# Patient Record
Sex: FEMALE | Race: WHITE | NOT HISPANIC OR LATINO | Employment: FULL TIME | URBAN - METROPOLITAN AREA
[De-identification: names, ages, dates, MRNs, and addresses within clinical notes are randomized per-mention and may not be internally consistent; named-entity substitution may affect disease eponyms.]

---

## 2024-01-11 ENCOUNTER — APPOINTMENT (OUTPATIENT)
Dept: RADIOLOGY | Facility: CLINIC | Age: 79
End: 2024-01-11
Payer: MEDICARE

## 2024-01-25 ENCOUNTER — HOSPITAL ENCOUNTER (OUTPATIENT)
Dept: RADIOLOGY | Facility: CLINIC | Age: 79
Discharge: HOME | End: 2024-01-25
Payer: MEDICARE

## 2024-01-25 VITALS — BODY MASS INDEX: 27.81 KG/M2 | HEIGHT: 63 IN | WEIGHT: 156.97 LBS

## 2024-01-25 DIAGNOSIS — Z12.31 ENCOUNTER FOR SCREENING MAMMOGRAM FOR MALIGNANT NEOPLASM OF BREAST: ICD-10-CM

## 2024-01-25 PROCEDURE — 77067 SCR MAMMO BI INCL CAD: CPT | Performed by: RADIOLOGY

## 2024-01-25 PROCEDURE — 77067 SCR MAMMO BI INCL CAD: CPT

## 2024-01-25 PROCEDURE — 77063 BREAST TOMOSYNTHESIS BI: CPT | Performed by: RADIOLOGY

## 2024-09-11 ENCOUNTER — TELEPHONE (OUTPATIENT)
Dept: UROLOGY | Facility: CLINIC | Age: 79
End: 2024-09-11
Payer: MEDICARE

## 2024-09-11 ENCOUNTER — LAB (OUTPATIENT)
Dept: LAB | Facility: LAB | Age: 79
End: 2024-09-11
Payer: MEDICARE

## 2024-09-11 DIAGNOSIS — R39.9 UTI SYMPTOMS: Primary | ICD-10-CM

## 2024-09-11 DIAGNOSIS — R39.9 UTI SYMPTOMS: ICD-10-CM

## 2024-09-11 LAB
APPEARANCE UR: CLEAR
BILIRUB UR STRIP.AUTO-MCNC: NEGATIVE MG/DL
COLOR UR: ABNORMAL
GLUCOSE UR STRIP.AUTO-MCNC: NORMAL MG/DL
KETONES UR STRIP.AUTO-MCNC: NEGATIVE MG/DL
LEUKOCYTE ESTERASE UR QL STRIP.AUTO: NEGATIVE
MUCOUS THREADS #/AREA URNS AUTO: ABNORMAL /LPF
NITRITE UR QL STRIP.AUTO: NEGATIVE
PH UR STRIP.AUTO: 6 [PH]
PROT UR STRIP.AUTO-MCNC: NEGATIVE MG/DL
RBC # UR STRIP.AUTO: ABNORMAL /UL
RBC #/AREA URNS AUTO: >20 /HPF
SP GR UR STRIP.AUTO: 1.02
SQUAMOUS #/AREA URNS AUTO: ABNORMAL /HPF
UROBILINOGEN UR STRIP.AUTO-MCNC: NORMAL MG/DL
WBC #/AREA URNS AUTO: ABNORMAL /HPF

## 2024-09-11 PROCEDURE — 81001 URINALYSIS AUTO W/SCOPE: CPT

## 2024-09-11 NOTE — TELEPHONE ENCOUNTER
Spoke with pt about symptoms. Pt states she is having urgency and pain. Urinalysis with culture ordered.

## 2024-09-12 ENCOUNTER — OFFICE VISIT (OUTPATIENT)
Dept: UROLOGY | Facility: CLINIC | Age: 79
End: 2024-09-12
Payer: MEDICARE

## 2024-09-12 VITALS
BODY MASS INDEX: 27.64 KG/M2 | WEIGHT: 156 LBS | HEART RATE: 87 BPM | SYSTOLIC BLOOD PRESSURE: 166 MMHG | HEIGHT: 63 IN | TEMPERATURE: 97.3 F | DIASTOLIC BLOOD PRESSURE: 95 MMHG

## 2024-09-12 DIAGNOSIS — R35.0 URINARY FREQUENCY: ICD-10-CM

## 2024-09-12 DIAGNOSIS — R31.29 MICROHEMATURIA: Primary | ICD-10-CM

## 2024-09-12 LAB — HOLD SPECIMEN: NORMAL

## 2024-09-12 PROCEDURE — 1159F MED LIST DOCD IN RCRD: CPT | Performed by: NURSE PRACTITIONER

## 2024-09-12 PROCEDURE — 99203 OFFICE O/P NEW LOW 30 MIN: CPT | Performed by: NURSE PRACTITIONER

## 2024-09-12 PROCEDURE — G2211 COMPLEX E/M VISIT ADD ON: HCPCS | Performed by: NURSE PRACTITIONER

## 2024-09-12 RX ORDER — ATORVASTATIN CALCIUM 10 MG/1
1 TABLET, FILM COATED ORAL DAILY
COMMUNITY
Start: 2014-10-07

## 2024-09-12 RX ORDER — LISINOPRIL 20 MG/1
1 TABLET ORAL DAILY
COMMUNITY
Start: 2014-10-07

## 2024-09-12 NOTE — PROGRESS NOTES
Subjective   Patient ID: Jeane Mariee is a 79 y.o. female who presents for No chief complaint on file..  Presents for eval of new onset urinary frequency over the last year. History of nephrolithiasis twice, most recently in 2020, both passed spontaneously. Denies history of gross hematuria and dysuria.     Urinating up to 12 times per day and up to 3 times per night. Wearing one pad/day. Moderate amount of urgency. Two isolated episodes of nocturnal enuresis over the last year. Very minimal JOSE A.     Drinks minimal amount of fluids per her perception, combo of milk, water, pop, and juice.     Never smoked. Denies environmental toxin exposure.         Review of Systems   All other systems reviewed and are negative.      Objective   Physical Exam  Vitals reviewed.     Constitutional: Patient generally appears stated age. Good nutrition. No deformities. Good attention to grooming.  Neck: No neck masses. Good symmetry. No crepitus. Normal thyroid size and consistency with no masses.  Respiratory: Normal respiratory effort. No intercostal retractions. No use of accessory muscles.  Cardiovascular: Examination of the peripheral vascular system reveals no swelling or varicosities with good pulses. Normal extremity temperature, no edema or tenderness.  Abdomen: Examination of the abdomen reveals no masses or tenderness. No hernias detected. Normal liver and spleen size.   Lymphatic: No palpable lymph nodes in the neck, axilla, groin or other locations  Skin: Inspection of the skin reveals no rashes, lesions, ulcers.  Neurologic/Psychiatric: Oriented to time, place and person. Normal mood and affect with no depression, anxiety, or agitation.    No visits with results within 1 Day(s) from this visit.   Latest known visit with results is:   Lab on 09/11/2024   Component Date Value Ref Range Status    Color, Urine 09/11/2024 Light-Yellow  Light-Yellow, Yellow, Dark-Yellow Final    Appearance, Urine 09/11/2024 Clear  Clear  Final    Specific Gravity, Urine 09/11/2024 1.019  1.005 - 1.035 Final    pH, Urine 09/11/2024 6.0  5.0, 5.5, 6.0, 6.5, 7.0, 7.5, 8.0 Final    Protein, Urine 09/11/2024 NEGATIVE  NEGATIVE, 10 (TRACE), 20 (TRACE) mg/dL Final    Glucose, Urine 09/11/2024 Normal  Normal mg/dL Final    Blood, Urine 09/11/2024 0.1 (1+) (A)  NEGATIVE Final    Ketones, Urine 09/11/2024 NEGATIVE  NEGATIVE mg/dL Final    Bilirubin, Urine 09/11/2024 NEGATIVE  NEGATIVE Final    Urobilinogen, Urine 09/11/2024 Normal  Normal mg/dL Final    Nitrite, Urine 09/11/2024 NEGATIVE  NEGATIVE Final    Leukocyte Esterase, Urine 09/11/2024 NEGATIVE  NEGATIVE Final    Extra Tube 09/11/2024 Hold for add-ons.   Final    Auto resulted.    WBC, Urine 09/11/2024 1-5  1-5, NONE /HPF Final    RBC, Urine 09/11/2024 >20 (A)  NONE, 1-2, 3-5 /HPF Final    Squamous Epithelial Cells, Urine 09/11/2024 1-9 (SPARSE)  Reference range not established. /HPF Final    Mucus, Urine 09/11/2024 FEW  Reference range not established. /LPF Final         Assessment/Plan   Diagnoses and all orders for this visit:  Microhematuria  -     CT urography w 3D volume rendered imaging; Future  -     Creatinine, Serum; Future  -     Cystourethroscopy; Future  Urinary frequency  -     Post-Void Residual  -     CT urography w 3D volume rendered imaging; Future  -     Creatinine, Serum; Future  -     Cystourethroscopy; Future    Reviewed AUA guidelines for further eval of microhematuria. Will proceed with CTU for further eval and follow up cystoscopy. Strongly encouraged increase in fluid intake. Patient is in agreement with plan. She will be scheduled accordingly.        SONU Gipson-CNP 09/12/24 1:49 PM

## 2024-09-18 ENCOUNTER — LAB (OUTPATIENT)
Dept: LAB | Facility: LAB | Age: 79
End: 2024-09-18
Payer: MEDICARE

## 2024-09-18 DIAGNOSIS — R35.0 URINARY FREQUENCY: ICD-10-CM

## 2024-09-18 DIAGNOSIS — R31.29 MICROHEMATURIA: ICD-10-CM

## 2024-09-18 LAB
CREAT SERPL-MCNC: 0.95 MG/DL (ref 0.5–1.05)
EGFRCR SERPLBLD CKD-EPI 2021: 60 ML/MIN/1.73M*2

## 2024-09-18 PROCEDURE — 82565 ASSAY OF CREATININE: CPT

## 2024-09-18 PROCEDURE — 36415 COLL VENOUS BLD VENIPUNCTURE: CPT

## 2024-09-24 ENCOUNTER — HOSPITAL ENCOUNTER (OUTPATIENT)
Dept: RADIOLOGY | Facility: HOSPITAL | Age: 79
Discharge: HOME | End: 2024-09-24
Payer: MEDICARE

## 2024-09-24 DIAGNOSIS — R31.29 MICROHEMATURIA: ICD-10-CM

## 2024-09-24 DIAGNOSIS — R35.0 URINARY FREQUENCY: ICD-10-CM

## 2024-09-24 PROCEDURE — 74177 CT ABD & PELVIS W/CONTRAST: CPT | Performed by: RADIOLOGY

## 2024-09-24 PROCEDURE — 76377 3D RENDER W/INTRP POSTPROCES: CPT

## 2024-09-24 PROCEDURE — 76377 3D RENDER W/INTRP POSTPROCES: CPT | Performed by: RADIOLOGY

## 2024-09-24 PROCEDURE — 2550000001 HC RX 255 CONTRASTS: Performed by: NURSE PRACTITIONER

## 2024-09-26 ENCOUNTER — APPOINTMENT (OUTPATIENT)
Dept: UROLOGY | Facility: CLINIC | Age: 79
End: 2024-09-26
Payer: MEDICARE

## 2024-09-26 ENCOUNTER — TELEPHONE (OUTPATIENT)
Dept: UROLOGY | Facility: CLINIC | Age: 79
End: 2024-09-26

## 2024-09-27 ENCOUNTER — TELEPHONE (OUTPATIENT)
Dept: UROLOGY | Facility: CLINIC | Age: 79
End: 2024-09-27

## 2024-10-04 ENCOUNTER — HOSPITAL ENCOUNTER (EMERGENCY)
Facility: HOSPITAL | Age: 79
Discharge: HOME | End: 2024-10-04
Attending: EMERGENCY MEDICINE
Payer: MEDICARE

## 2024-10-04 ENCOUNTER — APPOINTMENT (OUTPATIENT)
Dept: RADIOLOGY | Facility: HOSPITAL | Age: 79
End: 2024-10-04
Payer: MEDICARE

## 2024-10-04 ENCOUNTER — APPOINTMENT (OUTPATIENT)
Dept: CARDIOLOGY | Facility: HOSPITAL | Age: 79
End: 2024-10-04
Payer: MEDICARE

## 2024-10-04 VITALS
BODY MASS INDEX: 26.93 KG/M2 | SYSTOLIC BLOOD PRESSURE: 160 MMHG | RESPIRATION RATE: 16 BRPM | OXYGEN SATURATION: 98 % | TEMPERATURE: 98.7 F | HEIGHT: 63 IN | HEART RATE: 94 BPM | WEIGHT: 152 LBS | DIASTOLIC BLOOD PRESSURE: 93 MMHG

## 2024-10-04 DIAGNOSIS — N20.0 KIDNEY STONE: Primary | ICD-10-CM

## 2024-10-04 LAB
ALBUMIN SERPL BCP-MCNC: 3.9 G/DL (ref 3.4–5)
ALP SERPL-CCNC: 76 U/L (ref 33–136)
ALT SERPL W P-5'-P-CCNC: 12 U/L (ref 7–45)
ANION GAP SERPL CALC-SCNC: 14 MMOL/L (ref 10–20)
APPEARANCE UR: CLEAR
AST SERPL W P-5'-P-CCNC: 16 U/L (ref 9–39)
BACTERIA #/AREA URNS AUTO: ABNORMAL /HPF
BASOPHILS # BLD AUTO: 0.06 X10*3/UL (ref 0–0.1)
BASOPHILS NFR BLD AUTO: 0.7 %
BILIRUB SERPL-MCNC: 0.8 MG/DL (ref 0–1.2)
BILIRUB UR STRIP.AUTO-MCNC: NEGATIVE MG/DL
BUN SERPL-MCNC: 25 MG/DL (ref 6–23)
CALCIUM SERPL-MCNC: 9.8 MG/DL (ref 8.6–10.3)
CARDIAC TROPONIN I PNL SERPL HS: 4 NG/L (ref 0–13)
CHLORIDE SERPL-SCNC: 104 MMOL/L (ref 98–107)
CO2 SERPL-SCNC: 23 MMOL/L (ref 21–32)
COLOR UR: ABNORMAL
CREAT SERPL-MCNC: 1.24 MG/DL (ref 0.5–1.05)
EGFRCR SERPLBLD CKD-EPI 2021: 44 ML/MIN/1.73M*2
EOSINOPHIL # BLD AUTO: 0.07 X10*3/UL (ref 0–0.4)
EOSINOPHIL NFR BLD AUTO: 0.9 %
ERYTHROCYTE [DISTWIDTH] IN BLOOD BY AUTOMATED COUNT: 12.8 % (ref 11.5–14.5)
GLUCOSE SERPL-MCNC: 102 MG/DL (ref 74–99)
GLUCOSE UR STRIP.AUTO-MCNC: NORMAL MG/DL
HCT VFR BLD AUTO: 40.9 % (ref 36–46)
HGB BLD-MCNC: 13.3 G/DL (ref 12–16)
HOLD SPECIMEN: NORMAL
HOLD SPECIMEN: NORMAL
IMM GRANULOCYTES # BLD AUTO: 0.03 X10*3/UL (ref 0–0.5)
IMM GRANULOCYTES NFR BLD AUTO: 0.4 % (ref 0–0.9)
KETONES UR STRIP.AUTO-MCNC: NEGATIVE MG/DL
LEUKOCYTE ESTERASE UR QL STRIP.AUTO: NEGATIVE
LIPASE SERPL-CCNC: 45 U/L (ref 9–82)
LYMPHOCYTES # BLD AUTO: 0.71 X10*3/UL (ref 0.8–3)
LYMPHOCYTES NFR BLD AUTO: 8.6 %
MCH RBC QN AUTO: 30.3 PG (ref 26–34)
MCHC RBC AUTO-ENTMCNC: 32.5 G/DL (ref 32–36)
MCV RBC AUTO: 93 FL (ref 80–100)
MONOCYTES # BLD AUTO: 0.69 X10*3/UL (ref 0.05–0.8)
MONOCYTES NFR BLD AUTO: 8.4 %
MUCOUS THREADS #/AREA URNS AUTO: ABNORMAL /LPF
NEUTROPHILS # BLD AUTO: 6.66 X10*3/UL (ref 1.6–5.5)
NEUTROPHILS NFR BLD AUTO: 81 %
NITRITE UR QL STRIP.AUTO: NEGATIVE
NRBC BLD-RTO: 0 /100 WBCS (ref 0–0)
PH UR STRIP.AUTO: 5 [PH]
PLATELET # BLD AUTO: 224 X10*3/UL (ref 150–450)
POTASSIUM SERPL-SCNC: 4 MMOL/L (ref 3.5–5.3)
PROT SERPL-MCNC: 7.2 G/DL (ref 6.4–8.2)
PROT UR STRIP.AUTO-MCNC: NEGATIVE MG/DL
RBC # BLD AUTO: 4.39 X10*6/UL (ref 4–5.2)
RBC # UR STRIP.AUTO: ABNORMAL /UL
RBC #/AREA URNS AUTO: ABNORMAL /HPF
SODIUM SERPL-SCNC: 137 MMOL/L (ref 136–145)
SP GR UR STRIP.AUTO: 1.01
SQUAMOUS #/AREA URNS AUTO: ABNORMAL /HPF
UROBILINOGEN UR STRIP.AUTO-MCNC: NORMAL MG/DL
WBC # BLD AUTO: 8.2 X10*3/UL (ref 4.4–11.3)
WBC #/AREA URNS AUTO: ABNORMAL /HPF

## 2024-10-04 PROCEDURE — 80053 COMPREHEN METABOLIC PANEL: CPT | Performed by: STUDENT IN AN ORGANIZED HEALTH CARE EDUCATION/TRAINING PROGRAM

## 2024-10-04 PROCEDURE — 96365 THER/PROPH/DIAG IV INF INIT: CPT | Mod: 59

## 2024-10-04 PROCEDURE — 81001 URINALYSIS AUTO W/SCOPE: CPT | Performed by: EMERGENCY MEDICINE

## 2024-10-04 PROCEDURE — 74177 CT ABD & PELVIS W/CONTRAST: CPT | Performed by: STUDENT IN AN ORGANIZED HEALTH CARE EDUCATION/TRAINING PROGRAM

## 2024-10-04 PROCEDURE — 93005 ELECTROCARDIOGRAM TRACING: CPT

## 2024-10-04 PROCEDURE — 2550000001 HC RX 255 CONTRASTS: Performed by: EMERGENCY MEDICINE

## 2024-10-04 PROCEDURE — 99285 EMERGENCY DEPT VISIT HI MDM: CPT | Mod: 25

## 2024-10-04 PROCEDURE — 87086 URINE CULTURE/COLONY COUNT: CPT | Mod: STJLAB | Performed by: EMERGENCY MEDICINE

## 2024-10-04 PROCEDURE — 80053 COMPREHEN METABOLIC PANEL: CPT | Performed by: EMERGENCY MEDICINE

## 2024-10-04 PROCEDURE — 83690 ASSAY OF LIPASE: CPT | Performed by: EMERGENCY MEDICINE

## 2024-10-04 PROCEDURE — 36415 COLL VENOUS BLD VENIPUNCTURE: CPT | Performed by: STUDENT IN AN ORGANIZED HEALTH CARE EDUCATION/TRAINING PROGRAM

## 2024-10-04 PROCEDURE — 83690 ASSAY OF LIPASE: CPT | Performed by: STUDENT IN AN ORGANIZED HEALTH CARE EDUCATION/TRAINING PROGRAM

## 2024-10-04 PROCEDURE — 99285 EMERGENCY DEPT VISIT HI MDM: CPT | Performed by: EMERGENCY MEDICINE

## 2024-10-04 PROCEDURE — 74177 CT ABD & PELVIS W/CONTRAST: CPT

## 2024-10-04 PROCEDURE — 2500000004 HC RX 250 GENERAL PHARMACY W/ HCPCS (ALT 636 FOR OP/ED): Performed by: EMERGENCY MEDICINE

## 2024-10-04 PROCEDURE — 85025 COMPLETE CBC W/AUTO DIFF WBC: CPT | Performed by: STUDENT IN AN ORGANIZED HEALTH CARE EDUCATION/TRAINING PROGRAM

## 2024-10-04 PROCEDURE — 85025 COMPLETE CBC W/AUTO DIFF WBC: CPT | Performed by: EMERGENCY MEDICINE

## 2024-10-04 PROCEDURE — 84484 ASSAY OF TROPONIN QUANT: CPT | Performed by: EMERGENCY MEDICINE

## 2024-10-04 RX ORDER — CEFTRIAXONE 1 G/50ML
1 INJECTION, SOLUTION INTRAVENOUS ONCE
Status: COMPLETED | OUTPATIENT
Start: 2024-10-04 | End: 2024-10-04

## 2024-10-04 RX ORDER — ONDANSETRON 4 MG/1
4 TABLET, ORALLY DISINTEGRATING ORAL EVERY 8 HOURS PRN
Qty: 15 TABLET | Refills: 0 | Status: SHIPPED | OUTPATIENT
Start: 2024-10-04

## 2024-10-04 RX ORDER — TAMSULOSIN HYDROCHLORIDE 0.4 MG/1
0.4 CAPSULE ORAL DAILY
Qty: 7 CAPSULE | Refills: 0 | Status: SHIPPED | OUTPATIENT
Start: 2024-10-04 | End: 2024-10-11

## 2024-10-04 RX ORDER — CEPHALEXIN 500 MG/1
500 CAPSULE ORAL 4 TIMES DAILY
Qty: 28 CAPSULE | Refills: 0 | Status: SHIPPED | OUTPATIENT
Start: 2024-10-04 | End: 2024-10-11

## 2024-10-04 RX ORDER — OXYCODONE AND ACETAMINOPHEN 5; 325 MG/1; MG/1
1 TABLET ORAL EVERY 8 HOURS PRN
Qty: 9 TABLET | Refills: 0 | Status: SHIPPED | OUTPATIENT
Start: 2024-10-04

## 2024-10-04 ASSESSMENT — COLUMBIA-SUICIDE SEVERITY RATING SCALE - C-SSRS
6. HAVE YOU EVER DONE ANYTHING, STARTED TO DO ANYTHING, OR PREPARED TO DO ANYTHING TO END YOUR LIFE?: NO
1. IN THE PAST MONTH, HAVE YOU WISHED YOU WERE DEAD OR WISHED YOU COULD GO TO SLEEP AND NOT WAKE UP?: NO
2. HAVE YOU ACTUALLY HAD ANY THOUGHTS OF KILLING YOURSELF?: NO

## 2024-10-04 ASSESSMENT — PAIN DESCRIPTION - ONSET: ONSET: ONGOING

## 2024-10-04 ASSESSMENT — PAIN DESCRIPTION - PAIN TYPE: TYPE: ACUTE PAIN

## 2024-10-04 ASSESSMENT — LIFESTYLE VARIABLES
EVER FELT BAD OR GUILTY ABOUT YOUR DRINKING: NO
TOTAL SCORE: 0
HAVE YOU EVER FELT YOU SHOULD CUT DOWN ON YOUR DRINKING: NO
HAVE PEOPLE ANNOYED YOU BY CRITICIZING YOUR DRINKING: NO
EVER HAD A DRINK FIRST THING IN THE MORNING TO STEADY YOUR NERVES TO GET RID OF A HANGOVER: NO

## 2024-10-04 ASSESSMENT — PAIN DESCRIPTION - PROGRESSION: CLINICAL_PROGRESSION: NOT CHANGED

## 2024-10-04 ASSESSMENT — PAIN - FUNCTIONAL ASSESSMENT: PAIN_FUNCTIONAL_ASSESSMENT: 0-10

## 2024-10-04 ASSESSMENT — PAIN DESCRIPTION - LOCATION: LOCATION: ABDOMEN

## 2024-10-04 ASSESSMENT — PAIN DESCRIPTION - DESCRIPTORS: DESCRIPTORS: ACHING;CRAMPING;SPASM

## 2024-10-04 ASSESSMENT — PAIN DESCRIPTION - FREQUENCY: FREQUENCY: CONSTANT/CONTINUOUS

## 2024-10-04 ASSESSMENT — PAIN DESCRIPTION - ORIENTATION: ORIENTATION: RIGHT

## 2024-10-04 ASSESSMENT — PAIN SCALES - GENERAL
PAINLEVEL_OUTOF10: 0 - NO PAIN
PAINLEVEL_OUTOF10: 6

## 2024-10-04 NOTE — ED PROVIDER NOTES
Emergency Department Provider Note        History of Present Illness     History provided by: Patient  Limitations to History: None  External Records Reviewed with Brief Summary: None    HPI:  Jeane Mariee is a 79 y.o. female with a history of 2 episodes of nephrolithiasis who presents to the emergency department with right upper quadrant abdominal pain that started at 10 AM today without any triggering factors.  The patient says that originally her abdominal pain was a 7 out of 10 in severity but has now waned to a 4 out of 10 in severity.  The patient says that the pain is sharp at times and dull at times.  The patient took 2 Tylenol tablets this morning for her pain but states that it did not help.  Nothing makes the pain better or worse.  The patient states that she thinks she is having kidney stones because her pain currently feels just like the 2 episodes of abdominal pain she had when she had kidney stones.  The patient's last kidney stone was 3 years ago and the patient states that it passed spontaneously without intervention.  The patient states that she has not had intervention for her kidney stones in the past.  The patient states that she has vomited twice since the start of her abdominal pain, both times of green bile.  The patient states that this vomiting is also very similar to her symptoms the last 2 times she has had kidney stones.  The patient states that she still has her gallbladder and appendix.  The patient denies the use of any blood thinners.  The patient denies any chest pain, shortness of breath, fever, or urinary problems.  The patient denies any back pain.    Physical Exam   Triage vitals:  T 36.7 °C (98.1 °F)  HR 84  BP (!) 193/94  RR 18  O2 100 % None (Room air)    General: Awake, alert, in no acute distress  Eyes: Gaze conjugate.  No scleral icterus or injection  HENT: Normo-cephalic, atraumatic. No stridor  CV: Regular rate, regular rhythm. Radial pulses 2+ bilaterally  Resp:  Breathing non-labored, speaking in full sentences.  Clear to auscultation bilaterally  GI: Tenderness in the right upper quadrant. No rebound or guarding.  MSK/Extremities: No gross bony deformities. Moving all extremities  Skin: Warm. Appropriate color  Neuro: Alert. Oriented. Face symmetric. Speech is fluent.  Gross strength and sensation intact in b/l UE and LEs  Psych: Appropriate mood and affect    Medical Decision Making & ED Course   Medical Decision Makin y.o. female with a history of nephrolithiasis who presents to the emergency department with right upper quadrant abdominal pain.  The patient received a CBC and CMP to look for signs of infection, acute bleeding, or electrolyte abnormalities that might contribute to the patient's abdominal pain.  The patient's CBC did not reveal any significant abnormalities such as an elevated WBC or low hemoglobin.  The patient's lipase was not elevated, indicating that the patient is unlikely to have acute pancreatitis.  The patient received a CT of the abdomen pelvis with IV contrast due to having tenderness in the right upper quadrant.  The patient CT scan of the abdomen pelvis revealed that the patient has a 6 mm kidney stone in the distal ureter with pyelitis.  Due to the finding on CT abdomen pelvis with IV contrast, the urologist was consulted about the patient's condition.  The urologist believes that it is possible that the patient may pass the kidney stone on her own since she has passed kidney stones without intervention in the past before.  To urologist recommends the patient be started on Flomax for any urinary problems, oxycodone for pain, and advised the patient to drink lots of fluids to help pass the stone.  The urologist also recommends that the patient be followed up in 2 weeks for repeat CT scan to assess for whether or not the kidney stone has passed.  After the patient's urine analysis came back, the urinalysis revealed that the patient had a  mild urinary tract infection.  The urologist was again consulted due to this new finding as a urinary tract infection can exacerbate the complication of pyelitis.  The urologist recommends that the patient can be discharged.  The patient was discharged with Flomax, oxycodone, Keflex, and recommendations to drink lots of fluids to help pass the kidney stone.  The patient was advised to follow-up with urology in 2 weeks for further assessment of her kidney stones and to have a repeat CT scan to make sure that the patient's stone has passed.  The patient was discharged with return precautions.  ----      Differential diagnoses considered include but are not limited to: Nephrolithiasis, pyelonephritis, urinary tract infection, diverticulitis, ovarian torsion, myocardial infarction.     Social Determinants of Health which Significantly Impact Care: None identified     Independent Result Review and Interpretation: Relevant laboratory and radiographic results were reviewed and independently interpreted by myself.  As necessary, they are commented on in the ED Course.    Chronic conditions affecting the patient's care: As documented above in [facility]    The patient was discussed with the following consultants/services: Dr. Morales    Care Considerations: As documented above in [facility]    ED Course:  Diagnoses as of 10/04/24 2225   Kidney stone     Disposition   As a result of the work-up, the patient was discharged home.  she was informed of her diagnosis and instructed to come back with any concerns or worsening of condition.  she and was agreeable to the plan as discussed above.  she was given the opportunity to ask questions.  All of the patient's questions were answered.    Procedures   Procedures    Patient seen and discussed with ED attending physician.    Kyung Coon MD  Emergency Medicine    =================Attending note===============    The patient was seen by the resident/fellow.  I have personally performed a  substantive portion of the encounter.  I have seen and examined the patient; agree with the workup, evaluation, MDM,   management and diagnosis.  The care plan has been discussed with the resident; I have reviewed the resident’s note and agree with the documented findings.       This is a 79 y.o. female who presents to ER with right-sided abdominal pain.  Started around 10 AM today.  No injuries.  She does have a history of kidney stones and it feels somewhat similar.  She has the pain is dull and sharp.  Her last kidney stone was around 3 years ago.  She tried taking Tylenol and it did not help.  She does not want anything for pain at this time.  She did vomit twice.  No fevers or chills.  No urinary symptoms.  No pain or burning or gross blood today.  No chest pain or shortness of breath.  She states a couple weeks ago she had blood in her urine at urology office and they did what appears to be a urography.  She reported that they told her that was unremarkable.  When I am looking at the results here today I do see that there was some mention of a stone on the right side.  It was 6 x 2 mm.  Heart is regular.  Lungs are clear.  Abdomen is soft.  There are some right upper lateral abdominal/anterior flank tenderness.  No guarding or rebound.  No peritoneal signs.  No acute distress.    Chemistry has a GFR of 44.  CBC unremarkable.  Troponin negative.  Lipase negative.                ==========================================       Kyung Coon MD  Resident  10/04/24 [id number]       Kyung Coon MD  Resident  10/04/24 [id number]       Kyung Coon MD  Resident  10/05/24 [id number]

## 2024-10-05 LAB
ATRIAL RATE: 79 BPM
HOLD SPECIMEN: NORMAL
P AXIS: 48 DEGREES
P OFFSET: 202 MS
P ONSET: 145 MS
PR INTERVAL: 152 MS
Q ONSET: 221 MS
QRS COUNT: 13 BEATS
QRS DURATION: 70 MS
QT INTERVAL: 378 MS
QTC CALCULATION(BAZETT): 433 MS
QTC FREDERICIA: 414 MS
R AXIS: -37 DEGREES
T AXIS: 60 DEGREES
T OFFSET: 410 MS
VENTRICULAR RATE: 79 BPM

## 2024-10-05 NOTE — DISCHARGE INSTRUCTIONS
Seek immediate medical attention if you develop:  difficulty urinating, you are unable to urinate, fever, new or worsening nausea or vomiting, chest pain, shortness of breath, worsening pain, or any new or worsening symptoms.    Strain your urine to collect your stone.

## 2024-10-06 LAB — BACTERIA UR CULT: NORMAL

## 2024-10-07 LAB
ATRIAL RATE: 79 BPM
P AXIS: 48 DEGREES
P OFFSET: 202 MS
P ONSET: 145 MS
PR INTERVAL: 152 MS
Q ONSET: 221 MS
QRS COUNT: 13 BEATS
QRS DURATION: 70 MS
QT INTERVAL: 378 MS
QTC CALCULATION(BAZETT): 433 MS
QTC FREDERICIA: 414 MS
R AXIS: -40 DEGREES
T AXIS: 60 DEGREES
T OFFSET: 410 MS
VENTRICULAR RATE: 79 BPM

## 2024-10-22 RX ORDER — LIDOCAINE HYDROCHLORIDE 20 MG/ML
1 JELLY TOPICAL ONCE
Status: SHIPPED | OUTPATIENT
Start: 2024-10-24

## 2024-10-23 NOTE — PROGRESS NOTES
HISTORY OF PRESENT ILLNESS:  This is a 79 y.o. female who presents for her cystoscopy. She was last seen by Kiersten Weldon CNP on [9/12/2024].     Microscopic hematuria  Urinary frequency  Hx of nephrolithiasis x2  Hx of carcinoma or lower outer quadrant of breast     Patient states has had urinary frequency before but denies it being worse now she has a stone. She also denies visibly seeing the kidney stone passing. Patient denies any right side flank pain.          PHYSICAL EXAMINATION:  No LMP recorded. Patient is postmenopausal.  Body mass index is 28.17 kg/m².  Visit Vitals  /88   Pulse 92   Temp 36.6 °C (97.9 °F)   Wt 72.1 kg (159 lb)   BMI 28.17 kg/m²   OB Status Postmenopausal   BSA 1.79 m²     General Appearance: well appearing  Neuro: Alert and oriented     Urine dip:   Recent Results (from the past 6 hours)   POCT UA Automated manually resulted    Collection Time: 10/24/24  9:13 AM   Result Value Ref Range    POC Color, Urine Yellow Straw, Yellow, Light-Yellow    POC Appearance, Urine Clear Clear    POC Glucose, Urine NEGATIVE NEGATIVE mg/dl    POC Bilirubin, Urine NEGATIVE NEGATIVE    POC Ketones, Urine NEGATIVE NEGATIVE mg/dl    POC Specific Gravity, Urine 1.025 1.005 - 1.035    POC Blood, Urine NEGATIVE NEGATIVE    POC PH, Urine 7.0 No Reference Range Established PH    POC Protein, Urine TRACE (A) NEGATIVE, 30 (1+) mg/dl    POC Urobilinogen, Urine 0.2 0.2, 1.0 EU/DL    Poc Nitrite, Urine NEGATIVE NEGATIVE    POC Leukocytes, Urine TRACE (A) NEGATIVE       Patient ID: Jeane Mariee is a 79 y.o. female.    Cystoscopy    Date/Time: 10/24/2024 9:00 AM    Performed by: Bakari Segovia MD  Authorized by: Bakari Segovia MD    Procedure - Bladder Cystoscopy:     Procedure details: cystoscopy    PROCEDURE NOTE:    OPERATION:  Flexible Cystourethroscopy    SURGEON: Bakari Segovia MD    ANESTHESIA:  2% lidocaine jelly    COMPLICATIONS:  None    SPECIMEN:  Voided urine was not collected and submitted for  cytology.    Estimated Blood Loss: Minimal     Complications: None     Patient presented for cystoscopy. They were brought to the procedure room, they were consented, the patient was prepped in normal fashion and placed on the cystobed.     A flexible scope was inserted and the entire urethra and bladder were examined.? A complete cystoscopy was performed. There were no mucosal lesions noted. The ureteral orifices were in normal location.     Small cyst found on the lining of the bladder. Bladder is trabeculated but no evidence of stones or tumors were present on the bladder wall. There is no sign of bleeding from either ureteral orifice.     The patient tolerated the procedure well. There were no complications. Routine post-cystoscopy instructions were given.      CT ABDOMEN PELVIS W IV CONTRAST;  10/4/2024 6:27 pm      INDICATION:  Signs/Symptoms:right abd pain.      COMPARISON:  CT abdomen pelvis dated 09/24/2024.      ACCESSION NUMBER(S):  [id number]      ORDERING CLINICIAN:  ALEKSANDRA MOYER      TECHNIQUE:  CT of the abdomen and pelvis was performed.  Standard contiguous  axial images were obtained through the abdomen and pelvis. Coronal  and sagittal reconstructions were performed.      75 ml of contrast Omnipaque 350 were administered intravenously  without immediate complication.      FINDINGS:  LOWER CHEST: No acute abnormality of the lung bases. Mild bibasilar  linear atelectasis with suggestion of bibasilar reticular scarring,  similar to prior. BONES: No acute osseous abnormality.  ABDOMINAL WALL: Within normal limits.  LYMPH NODES: No pathologically enlarged lymph nodes in the abdomen or  pelvis.      ABDOMEN:      LIVER: Normal size and contour. No focal hepatic lesions.  BILE DUCTS: Normal caliber.  GALLBLADDER: No calcified gallstones. No wall thickening.  PANCREAS: No evidence of pancreatic duct dilatation or peripancreatic  edema. SPLEEN: Within normal limits.  ADRENALS: Within normal  limits.  KIDNEYS and URETERS: There is a 6 mm obstructing calculus which was  previously seen in the proximal right ureter and is now within the  distal right ureter. There is hyperenhancement of the right renal  pelvis and mild moderate right hydroureteronephrosis, slightly  increased from prior. No hydronephrosis or nephrolithiasis on the  left. There are multiple left parapelvic cysts.          VESSELS: No aortic aneurysm. Moderate atherosclerotic calcifications  of the aorta. Mesenteric vessels appear patent. RETROPERITONEUM: No  evidence of retroperitoneal hematoma.      PELVIS:      REPRODUCTIVE ORGANS: No pelvic masses.  BLADDER: Collapsed limiting assessment.      BOWEL: No dilated bowel. Appendix is not visualized without secondary  signs of appendicitis. Stomach is collapsed limiting assessment.  Small hiatal hernia. Sigmoid diverticulosis without diverticulitis.  No evidence of bowel wall thickening. PERITONEUM: No ascites or free  air, no fluid collection.      IMPRESSION:  1.  Recently seen 6 mm calculus within the proximal right ureter has  progressed to the distal right ureter. There is mild-to-moderate  right hydroureteronephrosis increased from prior and hyperenhancement  of the right renal pelvis. Possibility of ascending pyelitis can not  be excluded.        IMPRESSION AND PLAN:  Jeane Mariee is a 79 y.o. who presents for her cystoscopy for gross hematuria    Hx of nephrolithiasis- right distal 6 mm stone-       Upon examination of the bladder, the cystoscopy showed clear urine in the bladder. The patient also denied having any overactive bladder symptoms- such as urinary frequency or urgency. This leads us to believe the stone has already passed, as it was previously noted to be in the distal ureter.    We will order the patient a KUB XR to be ensure the location of the kidney stone. If the stone is still present, we will contact the patient and have her undergo a ureteroscopy to locate the  stone. If the stone is not present or is unable to be located, we will continue to observe the patient for acute symptoms.     Follow up for KUB    Scribe Attestation  By signing my name below, Farrah MONTEIRO Scribe   attest that this documentation has been prepared under the direction and in the presence of Bakari Segovia MD.

## 2024-10-24 ENCOUNTER — APPOINTMENT (OUTPATIENT)
Dept: UROLOGY | Facility: CLINIC | Age: 79
End: 2024-10-24
Payer: MEDICARE

## 2024-10-24 ENCOUNTER — HOSPITAL ENCOUNTER (OUTPATIENT)
Dept: RADIOLOGY | Facility: HOSPITAL | Age: 79
Discharge: HOME | End: 2024-10-24
Payer: MEDICARE

## 2024-10-24 VITALS
BODY MASS INDEX: 28.17 KG/M2 | WEIGHT: 159 LBS | DIASTOLIC BLOOD PRESSURE: 88 MMHG | TEMPERATURE: 97.9 F | HEART RATE: 92 BPM | SYSTOLIC BLOOD PRESSURE: 148 MMHG

## 2024-10-24 DIAGNOSIS — R31.29 MICROHEMATURIA: ICD-10-CM

## 2024-10-24 DIAGNOSIS — N20.0 KIDNEY STONE: ICD-10-CM

## 2024-10-24 LAB
POC APPEARANCE, URINE: CLEAR
POC BILIRUBIN, URINE: NEGATIVE
POC BLOOD, URINE: NEGATIVE
POC COLOR, URINE: YELLOW
POC GLUCOSE, URINE: NEGATIVE MG/DL
POC KETONES, URINE: NEGATIVE MG/DL
POC LEUKOCYTES, URINE: ABNORMAL
POC NITRITE,URINE: NEGATIVE
POC PH, URINE: 7 PH
POC PROTEIN, URINE: ABNORMAL MG/DL
POC SPECIFIC GRAVITY, URINE: 1.02
POC UROBILINOGEN, URINE: 0.2 EU/DL

## 2024-10-24 PROCEDURE — 99213 OFFICE O/P EST LOW 20 MIN: CPT | Performed by: UROLOGY

## 2024-10-24 PROCEDURE — 52000 CYSTOURETHROSCOPY: CPT | Performed by: UROLOGY

## 2024-10-24 PROCEDURE — 81003 URINALYSIS AUTO W/O SCOPE: CPT | Performed by: UROLOGY

## 2024-10-24 PROCEDURE — 74018 RADEX ABDOMEN 1 VIEW: CPT

## 2024-11-04 ENCOUNTER — APPOINTMENT (OUTPATIENT)
Dept: OBSTETRICS AND GYNECOLOGY | Facility: CLINIC | Age: 79
End: 2024-11-04
Payer: MEDICARE

## 2024-11-04 ENCOUNTER — TELEPHONE (OUTPATIENT)
Dept: UROLOGY | Facility: CLINIC | Age: 79
End: 2024-11-04

## 2024-11-04 VITALS
SYSTOLIC BLOOD PRESSURE: 155 MMHG | BODY MASS INDEX: 28.05 KG/M2 | HEIGHT: 63 IN | WEIGHT: 158.3 LBS | DIASTOLIC BLOOD PRESSURE: 90 MMHG

## 2024-11-04 DIAGNOSIS — Z01.419 ENCOUNTER FOR ANNUAL ROUTINE GYNECOLOGICAL EXAMINATION: Primary | ICD-10-CM

## 2024-11-04 PROCEDURE — 1160F RVW MEDS BY RX/DR IN RCRD: CPT | Performed by: OBSTETRICS & GYNECOLOGY

## 2024-11-04 PROCEDURE — 1036F TOBACCO NON-USER: CPT | Performed by: OBSTETRICS & GYNECOLOGY

## 2024-11-04 PROCEDURE — 99387 INIT PM E/M NEW PAT 65+ YRS: CPT | Performed by: OBSTETRICS & GYNECOLOGY

## 2024-11-04 PROCEDURE — 1159F MED LIST DOCD IN RCRD: CPT | Performed by: OBSTETRICS & GYNECOLOGY

## 2024-11-04 NOTE — TELEPHONE ENCOUNTER
Attempted to contact patient regarding Xray results and to schedule appointment to follow up with Dr. Rama ENAMORADO for patient to return call to office at her soonest convenience.

## 2024-11-04 NOTE — TELEPHONE ENCOUNTER
----- Message from Bakari Segovia sent at 10/28/2024  3:12 PM EDT -----  It looks like the stone is gone.  Can you check on her and see if she is having any symptoms if not then she can follow-up with us in a couple of months.

## 2024-11-04 NOTE — PROGRESS NOTES
Subjective   Patient ID: Jeane Mariee is a 79 y.o. female who presents for Annual Exam (New patient here for annual/Pt denies any issues/concerns/Pt  has hx BR CA--25 years ago/Declined chaperone).  HPI  Patient is an 79-year-old  2 para 2 whose had 2 spontaneous vaginal deliveries.  Known history of breast cancer at age 53 had a right lumpectomy status post radiation and tamoxifen currently not on tamoxifen.  She does not use hormone therapy denies any vaginal bleeding.  She admits to regular bowel movements recently diagnosed with kidney stone is being followed by urology.  Review of Systems    Objective   Physical Exam  Gen.: Alert and in no acute distress. Well-developed, well-nourished.  Thyroid: Nonenlarged and no palpable thyroid nodules  Cardiovascular: Heart regular rate and rhythm  Pulmonary: Clear bilateral breath sounds  Breasts: Normal appearance, no nipple discharge and no skin changes. No palpable masses and no axillary lymphadenopathy  Abdomen: Soft and nontender, no abdominal mass palpated, no organomegaly   Pelvic: External genitalia atrophic with no lesions.  Urethral meatus normal size and location with no lesions.  No evidence of prolapse.  Urethra without masses tenderness or scarring.  Bladder appears normal without fullness masses or tenderness.  Vagina atrophic, no discharge or lesions noted.  Good pelvic support.  Cervix is clean no lesions or discharge is noted.  Uterus small freely mobile and nontender without prolapse.  Adnexa without masses or tenderness.  Perianal area and perineum normal without any lesions.  Assessment/Plan     Annual GYN exam, Pap and HPV no longer needed, mammograms through breast specialist.  Follow-up in 1 year or as needed.         Lucius Fernandes MD 24 12:03 PM

## 2024-11-14 ENCOUNTER — APPOINTMENT (OUTPATIENT)
Dept: GASTROENTEROLOGY | Facility: CLINIC | Age: 79
End: 2024-11-14
Payer: MEDICARE

## 2024-11-14 VITALS
BODY MASS INDEX: 26.8 KG/M2 | OXYGEN SATURATION: 95 % | DIASTOLIC BLOOD PRESSURE: 95 MMHG | HEART RATE: 105 BPM | HEIGHT: 64 IN | SYSTOLIC BLOOD PRESSURE: 147 MMHG | WEIGHT: 157 LBS

## 2024-11-14 DIAGNOSIS — K57.30 SIGMOID DIVERTICULOSIS: ICD-10-CM

## 2024-11-14 DIAGNOSIS — R10.32 BILATERAL LOWER ABDOMINAL CRAMPING: Primary | ICD-10-CM

## 2024-11-14 DIAGNOSIS — R10.31 BILATERAL LOWER ABDOMINAL CRAMPING: Primary | ICD-10-CM

## 2024-11-14 PROCEDURE — 1036F TOBACCO NON-USER: CPT | Performed by: NURSE PRACTITIONER

## 2024-11-14 PROCEDURE — 99203 OFFICE O/P NEW LOW 30 MIN: CPT | Performed by: NURSE PRACTITIONER

## 2024-11-14 PROCEDURE — 1160F RVW MEDS BY RX/DR IN RCRD: CPT | Performed by: NURSE PRACTITIONER

## 2024-11-14 PROCEDURE — 1159F MED LIST DOCD IN RCRD: CPT | Performed by: NURSE PRACTITIONER

## 2024-11-14 RX ORDER — DICYCLOMINE HYDROCHLORIDE 10 MG/1
10 CAPSULE ORAL 2 TIMES DAILY PRN
Qty: 730 CAPSULE | Refills: 3 | Status: SHIPPED | OUTPATIENT
Start: 2024-11-14 | End: 2024-12-14

## 2024-11-14 RX ORDER — DICYCLOMINE HYDROCHLORIDE 10 MG/1
10 CAPSULE ORAL 2 TIMES DAILY PRN
Qty: 730 CAPSULE | Refills: 3 | Status: SHIPPED | OUTPATIENT
Start: 2024-11-14 | End: 2024-11-14 | Stop reason: SDUPTHER

## 2024-11-14 NOTE — PROGRESS NOTES
Subjective   Patient ID: Jeane Mariee is a 79 y.o. female who presents for Abdominal Pain (Cramping lower abdomen).  HPI  Patient presents to the GI clinic with complaints of lower abdominal cramping that waxes and wanes for the past 3 to 4 weeks.  The cramping wakes her up at night.  Movement resolves her symptoms.  Recently diagnosed with a kidney stone.  Follows with urology.  Having daily soft formed bowel movements.  She denies bowel habit changes.  No melena or hematochezia.  Weight and appetite are stable.  No alcohol, smoking or excessive NSAID use.  No family history of CRC or IBD.  Patient reports that Dr. Braun has prescribed dicyclomine in the past for her when she would experience lower abdominal cramping, this medication helped.    CT A/P 10/4/24:  1.  Recently seen 6 mm calculus within the proximal right ureter has  progressed to the distal right ureter. There is mild-to-moderate  right hydroureteronephrosis increased from prior and hyperenhancement  of the right renal pelvis. Possibility of ascending pyelitis can not  be excluded.      ->Colonoscopy 2019:sigmoid diverticulosis      Current Outpatient Medications on File Prior to Visit   Medication Sig Dispense Refill    atorvastatin (Lipitor) 10 mg tablet Take 1 tablet (10 mg) by mouth once daily.      lisinopril 20 mg tablet Take 1 tablet (20 mg) by mouth once daily.      [DISCONTINUED] ondansetron ODT (Zofran-ODT) 4 mg disintegrating tablet Take 1 tablet (4 mg) by mouth every 8 hours if needed for nausea or vomiting. (Patient not taking: Reported on 11/14/2024) 15 tablet 0    [DISCONTINUED] oxyCODONE-acetaminophen (Percocet) 5-325 mg tablet Take 1 tablet by mouth every 8 hours if needed for severe pain (7 - 10). (Patient not taking: Reported on 11/14/2024) 9 tablet 0     Current Facility-Administered Medications on File Prior to Visit   Medication Dose Route Frequency Provider Last Rate Last Admin    lidocaine 2 % mucosal jelly (Uro-Jet) 1  "Application  1 Application urethral Once Bakari Segovia MD            Review of Systems   All other systems reviewed and are negative.      Objective   Physical Exam  Vitals reviewed.   Constitutional:       General: She is awake. She is not in acute distress.     Appearance: Normal appearance. She is well-developed and normal weight. She is not ill-appearing, toxic-appearing or diaphoretic.   HENT:      Head: Normocephalic and atraumatic.   Eyes:      General: No scleral icterus.     Pupils: Pupils are equal, round, and reactive to light.   Pulmonary:      Effort: Pulmonary effort is normal. No respiratory distress.   Abdominal:      General: Abdomen is protuberant. Bowel sounds are normal.      Palpations: Abdomen is soft. There is no hepatomegaly.      Tenderness: There is no abdominal tenderness.   Musculoskeletal:         General: Normal range of motion.      Cervical back: Normal range of motion.      Right lower leg: No edema.      Left lower leg: No edema.   Skin:     General: Skin is warm and dry.      Coloration: Skin is not cyanotic, jaundiced or pale.   Neurological:      General: No focal deficit present.      Mental Status: She is alert and oriented to person, place, and time.      Motor: No weakness.      Gait: Gait normal.   Psychiatric:         Mood and Affect: Mood normal.         Behavior: Behavior is cooperative.         Thought Content: Thought content normal.         Judgment: Judgment normal.       BP (!) 147/95   Pulse 105   Ht 1.613 m (5' 3.5\")   Wt 71.2 kg (157 lb)   SpO2 95%   BMI 27.38 kg/m²      Lab Results   Component Value Date    WBC 8.2 10/04/2024    HGB 13.3 10/04/2024    HCT 40.9 10/04/2024    MCV 93 10/04/2024     10/04/2024           No lab exists for component: \"LABALBU\"    No results found for: \"AFP\"  No results found for: \"TSH\"      Assessment/Plan   Diagnoses and all orders for this visit:  Bilateral lower abdominal cramping  -     dicyclomine (Bentyl) 10 mg " capsule; Take 1 capsule (10 mg) by mouth 2 times a day as needed (abdominal pain).  Sigmoid diverticulosis  79-year-old female with complaints of lower abdominal cramping that waxes and wanes.  She has experienced this symptom in the past and has had good response to dicyclomine.  Will trial the patient on dicyclomine twice daily as needed for abdominal cramping.  All possible side effects reviewed with the patient.  Will also recommend that she trial a course of Metamucil due to her history of diverticulosis.  She was also provided with samples of IBgard.  Patient will call to update me on her response.  No red flag symptoms noted.       Elizabeth Oh CNP

## 2025-01-28 ENCOUNTER — HOSPITAL ENCOUNTER (OUTPATIENT)
Dept: RADIOLOGY | Facility: CLINIC | Age: 80
Discharge: HOME | End: 2025-01-28
Payer: MEDICARE

## 2025-01-28 VITALS — WEIGHT: 156.97 LBS | BODY MASS INDEX: 26.8 KG/M2 | HEIGHT: 64 IN

## 2025-01-28 DIAGNOSIS — Z12.31 ENCOUNTER FOR SCREENING MAMMOGRAM FOR MALIGNANT NEOPLASM OF BREAST: ICD-10-CM

## 2025-01-28 PROCEDURE — 77063 BREAST TOMOSYNTHESIS BI: CPT | Performed by: RADIOLOGY

## 2025-01-28 PROCEDURE — 77067 SCR MAMMO BI INCL CAD: CPT | Performed by: RADIOLOGY

## 2025-01-28 PROCEDURE — 77067 SCR MAMMO BI INCL CAD: CPT

## 2025-02-06 ENCOUNTER — HOSPITAL ENCOUNTER (OUTPATIENT)
Dept: RADIOLOGY | Facility: CLINIC | Age: 80
Discharge: HOME | End: 2025-02-06
Payer: MEDICARE

## 2025-02-06 DIAGNOSIS — N63.20 UNSPECIFIED LUMP IN THE LEFT BREAST, UNSPECIFIED QUADRANT: ICD-10-CM

## 2025-02-06 DIAGNOSIS — R92.8 OTHER ABNORMAL AND INCONCLUSIVE FINDINGS ON DIAGNOSTIC IMAGING OF BREAST: ICD-10-CM

## 2025-02-06 PROCEDURE — 76642 ULTRASOUND BREAST LIMITED: CPT | Mod: LT

## 2025-02-06 PROCEDURE — 77061 BREAST TOMOSYNTHESIS UNI: CPT | Mod: LT

## 2025-02-06 PROCEDURE — 76981 USE PARENCHYMA: CPT | Mod: LT

## 2025-08-29 ENCOUNTER — HOSPITAL ENCOUNTER (OUTPATIENT)
Dept: RADIOLOGY | Facility: CLINIC | Age: 80
Discharge: HOME | End: 2025-08-29
Payer: MEDICARE

## 2025-08-29 ENCOUNTER — OFFICE VISIT (OUTPATIENT)
Dept: ORTHOPEDIC SURGERY | Facility: CLINIC | Age: 80
End: 2025-08-29
Payer: MEDICARE

## 2025-08-29 VITALS — BODY MASS INDEX: 27.31 KG/M2 | WEIGHT: 160 LBS | HEIGHT: 64 IN

## 2025-08-29 DIAGNOSIS — M25.561 ACUTE PAIN OF RIGHT KNEE: ICD-10-CM

## 2025-08-29 DIAGNOSIS — M25.562 BILATERAL CHRONIC KNEE PAIN: ICD-10-CM

## 2025-08-29 DIAGNOSIS — M17.0 PRIMARY OSTEOARTHRITIS OF BOTH KNEES: Primary | ICD-10-CM

## 2025-08-29 DIAGNOSIS — M25.561 BILATERAL CHRONIC KNEE PAIN: ICD-10-CM

## 2025-08-29 DIAGNOSIS — G89.29 BILATERAL CHRONIC KNEE PAIN: ICD-10-CM

## 2025-08-29 PROCEDURE — 73560 X-RAY EXAM OF KNEE 1 OR 2: CPT | Mod: RT

## 2025-08-29 PROCEDURE — 99202 OFFICE O/P NEW SF 15 MIN: CPT | Mod: 25 | Performed by: ORTHOPAEDIC SURGERY

## 2025-08-29 PROCEDURE — 99203 OFFICE O/P NEW LOW 30 MIN: CPT | Performed by: ORTHOPAEDIC SURGERY

## 2025-08-29 PROCEDURE — 1036F TOBACCO NON-USER: CPT | Performed by: ORTHOPAEDIC SURGERY

## 2025-08-29 PROCEDURE — 1160F RVW MEDS BY RX/DR IN RCRD: CPT | Performed by: ORTHOPAEDIC SURGERY

## 2025-08-29 PROCEDURE — 73560 X-RAY EXAM OF KNEE 1 OR 2: CPT | Mod: 50

## 2025-08-29 PROCEDURE — 20610 DRAIN/INJ JOINT/BURSA W/O US: CPT | Mod: 50 | Performed by: ORTHOPAEDIC SURGERY

## 2025-08-29 PROCEDURE — 1159F MED LIST DOCD IN RCRD: CPT | Performed by: ORTHOPAEDIC SURGERY

## 2025-08-29 RX ORDER — LIDOCAINE HYDROCHLORIDE 10 MG/ML
2 INJECTION, SOLUTION INFILTRATION; PERINEURAL
Status: COMPLETED | OUTPATIENT
Start: 2025-08-29 | End: 2025-08-29

## 2025-08-29 RX ORDER — TRIAMCINOLONE ACETONIDE 40 MG/ML
40 INJECTION, SUSPENSION INTRA-ARTICULAR; INTRAMUSCULAR
Status: COMPLETED | OUTPATIENT
Start: 2025-08-29 | End: 2025-08-29

## 2025-08-29 RX ADMIN — LIDOCAINE HYDROCHLORIDE 2 ML: 10 INJECTION, SOLUTION INFILTRATION; PERINEURAL at 16:04

## 2025-08-29 RX ADMIN — TRIAMCINOLONE ACETONIDE 40 MG: 40 INJECTION, SUSPENSION INTRA-ARTICULAR; INTRAMUSCULAR at 16:04
